# Patient Record
Sex: FEMALE | Race: WHITE | NOT HISPANIC OR LATINO | URBAN - METROPOLITAN AREA
[De-identification: names, ages, dates, MRNs, and addresses within clinical notes are randomized per-mention and may not be internally consistent; named-entity substitution may affect disease eponyms.]

---

## 2023-03-24 ENCOUNTER — EMERGENCY (EMERGENCY)
Facility: HOSPITAL | Age: 48
LOS: 0 days | Discharge: HOME | End: 2023-03-24
Attending: EMERGENCY MEDICINE
Payer: MEDICARE

## 2023-03-24 VITALS
HEART RATE: 88 BPM | RESPIRATION RATE: 18 BRPM | OXYGEN SATURATION: 100 % | DIASTOLIC BLOOD PRESSURE: 72 MMHG | SYSTOLIC BLOOD PRESSURE: 120 MMHG | TEMPERATURE: 98 F

## 2023-03-24 VITALS — HEIGHT: 67 IN | WEIGHT: 119.93 LBS

## 2023-03-24 DIAGNOSIS — E03.9 HYPOTHYROIDISM, UNSPECIFIED: ICD-10-CM

## 2023-03-24 DIAGNOSIS — R68.84 JAW PAIN: ICD-10-CM

## 2023-03-24 DIAGNOSIS — H10.9 UNSPECIFIED CONJUNCTIVITIS: ICD-10-CM

## 2023-03-24 DIAGNOSIS — Z88.5 ALLERGY STATUS TO NARCOTIC AGENT: ICD-10-CM

## 2023-03-24 DIAGNOSIS — R51.9 HEADACHE, UNSPECIFIED: ICD-10-CM

## 2023-03-24 DIAGNOSIS — J32.9 CHRONIC SINUSITIS, UNSPECIFIED: ICD-10-CM

## 2023-03-24 DIAGNOSIS — H57.89 OTHER SPECIFIED DISORDERS OF EYE AND ADNEXA: ICD-10-CM

## 2023-03-24 LAB
ALBUMIN SERPL ELPH-MCNC: 4 G/DL — SIGNIFICANT CHANGE UP (ref 3.3–5)
ALP SERPL-CCNC: 71 U/L — SIGNIFICANT CHANGE UP (ref 40–120)
ALT FLD-CCNC: 74 U/L — SIGNIFICANT CHANGE UP (ref 12–78)
ANION GAP SERPL CALC-SCNC: 5 MMOL/L — SIGNIFICANT CHANGE UP (ref 5–17)
AST SERPL-CCNC: 36 U/L — SIGNIFICANT CHANGE UP (ref 15–37)
BASOPHILS # BLD AUTO: 0.06 K/UL — SIGNIFICANT CHANGE UP (ref 0–0.2)
BASOPHILS NFR BLD AUTO: 0.9 % — SIGNIFICANT CHANGE UP (ref 0–2)
BILIRUB SERPL-MCNC: 0.5 MG/DL — SIGNIFICANT CHANGE UP (ref 0.2–1.2)
BUN SERPL-MCNC: 13 MG/DL — SIGNIFICANT CHANGE UP (ref 7–23)
CALCIUM SERPL-MCNC: 9.7 MG/DL — SIGNIFICANT CHANGE UP (ref 8.5–10.1)
CHLORIDE SERPL-SCNC: 108 MMOL/L — SIGNIFICANT CHANGE UP (ref 96–108)
CO2 SERPL-SCNC: 29 MMOL/L — SIGNIFICANT CHANGE UP (ref 22–31)
CREAT SERPL-MCNC: 0.43 MG/DL — LOW (ref 0.5–1.3)
EGFR: 121 ML/MIN/1.73M2 — SIGNIFICANT CHANGE UP
EOSINOPHIL # BLD AUTO: 0.14 K/UL — SIGNIFICANT CHANGE UP (ref 0–0.5)
EOSINOPHIL NFR BLD AUTO: 2.2 % — SIGNIFICANT CHANGE UP (ref 0–6)
GLUCOSE SERPL-MCNC: 110 MG/DL — HIGH (ref 70–99)
HCG SERPL-ACNC: <1 MIU/ML — SIGNIFICANT CHANGE UP
HCT VFR BLD CALC: 43.9 % — SIGNIFICANT CHANGE UP (ref 34.5–45)
HGB BLD-MCNC: 14.4 G/DL — SIGNIFICANT CHANGE UP (ref 11.5–15.5)
IMM GRANULOCYTES NFR BLD AUTO: 0.2 % — SIGNIFICANT CHANGE UP (ref 0–0.9)
LACTATE SERPL-SCNC: 0.9 MMOL/L — SIGNIFICANT CHANGE UP (ref 0.7–2)
LYMPHOCYTES # BLD AUTO: 2.04 K/UL — SIGNIFICANT CHANGE UP (ref 1–3.3)
LYMPHOCYTES # BLD AUTO: 31.5 % — SIGNIFICANT CHANGE UP (ref 13–44)
MCHC RBC-ENTMCNC: 30.8 PG — SIGNIFICANT CHANGE UP (ref 27–34)
MCHC RBC-ENTMCNC: 32.8 GM/DL — SIGNIFICANT CHANGE UP (ref 32–36)
MCV RBC AUTO: 94 FL — SIGNIFICANT CHANGE UP (ref 80–100)
MONOCYTES # BLD AUTO: 0.49 K/UL — SIGNIFICANT CHANGE UP (ref 0–0.9)
MONOCYTES NFR BLD AUTO: 7.6 % — SIGNIFICANT CHANGE UP (ref 2–14)
NEUTROPHILS # BLD AUTO: 3.73 K/UL — SIGNIFICANT CHANGE UP (ref 1.8–7.4)
NEUTROPHILS NFR BLD AUTO: 57.6 % — SIGNIFICANT CHANGE UP (ref 43–77)
PLATELET # BLD AUTO: 236 K/UL — SIGNIFICANT CHANGE UP (ref 150–400)
POTASSIUM SERPL-MCNC: 3.9 MMOL/L — SIGNIFICANT CHANGE UP (ref 3.5–5.3)
POTASSIUM SERPL-SCNC: 3.9 MMOL/L — SIGNIFICANT CHANGE UP (ref 3.5–5.3)
PROT SERPL-MCNC: 7.4 GM/DL — SIGNIFICANT CHANGE UP (ref 6–8.3)
RBC # BLD: 4.67 M/UL — SIGNIFICANT CHANGE UP (ref 3.8–5.2)
RBC # FLD: 13.8 % — SIGNIFICANT CHANGE UP (ref 10.3–14.5)
SODIUM SERPL-SCNC: 142 MMOL/L — SIGNIFICANT CHANGE UP (ref 135–145)
WBC # BLD: 6.47 K/UL — SIGNIFICANT CHANGE UP (ref 3.8–10.5)
WBC # FLD AUTO: 6.47 K/UL — SIGNIFICANT CHANGE UP (ref 3.8–10.5)

## 2023-03-24 PROCEDURE — 70487 CT MAXILLOFACIAL W/DYE: CPT | Mod: MA

## 2023-03-24 PROCEDURE — 87040 BLOOD CULTURE FOR BACTERIA: CPT | Mod: 91

## 2023-03-24 PROCEDURE — 99284 EMERGENCY DEPT VISIT MOD MDM: CPT

## 2023-03-24 PROCEDURE — 36415 COLL VENOUS BLD VENIPUNCTURE: CPT

## 2023-03-24 PROCEDURE — 80053 COMPREHEN METABOLIC PANEL: CPT

## 2023-03-24 PROCEDURE — 99284 EMERGENCY DEPT VISIT MOD MDM: CPT | Mod: 25

## 2023-03-24 PROCEDURE — 70487 CT MAXILLOFACIAL W/DYE: CPT | Mod: 26,MA

## 2023-03-24 PROCEDURE — 84702 CHORIONIC GONADOTROPIN TEST: CPT

## 2023-03-24 PROCEDURE — 85025 COMPLETE CBC W/AUTO DIFF WBC: CPT

## 2023-03-24 PROCEDURE — 83605 ASSAY OF LACTIC ACID: CPT

## 2023-03-24 RX ADMIN — Medication 1 TABLET(S): at 10:54

## 2023-03-24 NOTE — ED PROVIDER NOTE - PATIENT PORTAL LINK FT
You can access the FollowMyHealth Patient Portal offered by [facility] by registering at the following website: [email or web address]. By joining [employer]’s FollowMyHealth portal, you will also be able to view your health information using other applications (apps) compatible with our system.

## 2023-03-24 NOTE — ED PROVIDER NOTE - NSFOLLOWUPINSTRUCTIONS_ED_ALL_ED_FT
Please call and follow up with your doctor in 1-3 days.    Use Tylenol (acetaminophen) 1000mg every 6 hours and Motrin (Ibuprofen) 600 mg every 6 hours as need for fever/pain.    Return to the Emergency Department for worsening or persistent symptoms, and/or ANY NEW OR CONCERNING SYMPTOMS. If you have issues obtaining follow up, please call: [phone] ([phone] or [phone]  to obtain a doctor or specialist who takes your insurance in your area.      Conjunctivitis    WHAT YOU NEED TO KNOW:    What is conjunctivitis? Conjunctivitis, or pink eye, is inflammation of your conjunctiva. The conjunctiva is a thin tissue that covers the front of your eye and the back of your eyelids. The conjunctiva helps protect your eye and keep it moist.   Conjunctivitis         What causes conjunctivitis? Conjunctivitis is easily spread from person to person. The most common cause of conjunctivitis is infection with bacteria or a virus. This often happens when bacteria gets into your eye. This can happen when you touch your eye or wear contact lenses. Allergies are also a common cause of conjunctivitis. The cells in your conjunctiva can react to an allergen. Some examples of allergens include grass, dust, animal fur, or mascara.    What are the signs and symptoms of conjunctivitis? You will usually have symptoms in both eyes if your conjunctivitis is caused by allergies. You may also have other allergic symptoms, such as a rash or runny nose. Symptoms will usually start in 1 eye if your conjunctivitis is caused by a virus or bacteria. You may also have other symptoms of an infection, such as sore throat and fever. You may have any of the following:   •Redness in the whites of your eye      •Itching in your eye or around your eye      •Feeling like there is something in your eye      •Watery or thick, sticky discharge      •Crusty eyelids when you wake up in the morning      •Burning, stinging, or swelling in your eye      •Pain when you see bright light      How is conjunctivitis diagnosed? Your healthcare provider will ask about your symptoms and medical history. The provider will ask if you have been around anyone who is sick or has pink eye. The provider will ask if you have allergies. Tell your provider if you wear contact lenses. You may need any of the following:   •An eye exam will be done by your provider. Your provider will look at your eyes, eyelids, eyelashes, and the skin around your eyes. You will be asked to look in different directions. Your provider may gently press on your eye or eyelid to see if there is drainage. Your provider will also look for redness and swelling in your eyelids or conjunctiva. Your provider may gently swab your conjunctiva with a cotton swab and send it to the lab for tests. This will help your provider find out what is causing your conjunctivitis.       •A slit-lamp microscope is a special microscope with a bright light used to look into your eye. Your provider will look for signs of infection or inflammation. This microscope also helps your provider see if the different parts of your eyes are healthy.      How is conjunctivitis treated? Your conjunctivitis may go away on its own. Treatment depends on what is causing your conjunctivitis. You may need any of the following:   •Allergy medicine helps decrease itchy, red, swollen eyes caused by allergies. It may be given as a pill, eye drops, or nasal spray.      •Antibiotics may be needed if your conjunctivitis is caused by bacteria. This medicine may be given as a pill, eye drops, or eye ointment.      How can I manage my symptoms?   •Apply a cool compress. Wet a washcloth with cold water and place it on your eye. This will help decrease itching and irritation.      •Do not wear contact lenses. They can irritate your eye. Throw away the pair you are using and ask when you can wear them again. Use a new pair of lenses when your provider says it is okay.       •Avoid irritants. Stay away from smoke filled areas. Shield your eyes from wind and sun.       •Flush your eye. You may need to flush your eye with saline to help decrease your symptoms. Ask for more information on how to flush your eye.       How do I prevent the spread of conjunctivitis?   •Wash your hands with soap and water often. Wash your hands before and after you touch your eyes. Also wash your hands before you prepare or eat food and after you use the bathroom or change a diaper.  Handwashing           •Avoid allergens. Try to avoid the things that cause your allergies, such as pets, dust, or grass.       •Avoid contact with others. Do not share towels or washcloths. Try to stay away from others as much as possible. Ask when you can return to work or school.       •Throw away eye makeup. The bacteria that caused your conjunctivitis can stay in eye makeup. Throw away your current mascara and other eye makeup. Never share mascara or other eye makeup with anyone.      When should I seek immediate care?   •You have worsening eye pain.       •The swelling in your eye gets worse, even after treatment.       •Your vision suddenly becomes worse or you cannot see at all.      When should I call my doctor?   •You develop a fever and ear pain.      •You have tiny bumps or spots of blood on your eye.      •You have questions or concerns about your condition or care.      CARE AGREEMENT:    You have the right to help plan your care. Learn about your health condition and how it may be treated. Discuss treatment options with your healthcare providers to decide what care you want to receive. You always have the right to refuse treatment      Acute Pain, Adult      Acute pain is a type of sudden pain that may last for just a few days or for as long as six months. It is often related to an illness, injury, or medical procedure. Acute pain may be mild, moderate, or severe.    Pain can make it hard for you to do your normal, daily activities. It can cause anxiety and lead to other problems if it is left untreated. Treatment depends on the cause and severity of your pain. Acute pain usually goes away once your injury has healed or you are no longer ill.      Follow these instructions at home:      Medicines      •Take over-the-counter and prescription medicines only as told by your health care provider.      •Take the lowest dose of medicine for the shortest amount of time needed to relieve the pain.    •If you are taking prescription pain medicine:  •Do not stop taking the medicine suddenly. Talk to your health care provider about how and when to discontinue prescription medicine.      •Do not take more pills than told by your health care provider even if your pain is severe.      •Do not take other over-the-counter pain medicines in addition to prescription pain medicine unless told by your health care provider.      •Ask your health care provider if the medicine requires you to avoid driving or using heavy machinery.    •Ask your health care provider if the medicine can cause constipation. You may need to take these actions to prevent or treat constipation:  •Drink enough fluid to keep your urine pale yellow.      •Eat foods that are high in fiber, such as beans, whole grains, and fresh fruits and vegetables.      •Take over-the-counter or prescription medicines.      •Limit foods that are high in fat and processed sugars, such as fried or sweet foods.            Managing pain, stiffness, and swelling                   If directed, put ice on the affected area. To do this:  •Put ice in a plastic bag.      •Place a towel between your skin and the bag.      •Leave the ice on for 20 minutes, 2–3 times a day.      If directed, apply heat to the affected area as often as told by your health care provider. Use the heat source that your health care provider recommends, such as a moist heat pack or a heating pad.  •Place a towel between your skin and the heat source.      •Leave the heat on for 20–30 minutes.      •Remove the heat if your skin turns bright red. This is especially important if you are unable to feel pain, heat, or cold. You may have a greater risk of getting burned.      Activity     •Rest as told by your health care provider.      •Return to your normal activities as told by your health care provider. Ask your health care provider what activities are safe for you.      General instructions     •Check your pain level as told by your health care provider.      •Ask your health care provider if other strategies such as distraction, relaxation, or physical therapies can help your pain.      •Keep all follow-up visits as told by your health care provider. This is important.        Contact a health care provider if:    •Your pain is not controlled by medicine.      •Your pain does not improve or gets worse.      •You have side effects from pain medicines, such as vomiting or confusion.        Get help right away if you:    •Have severe pain.      •Have trouble breathing.      •Lose consciousness.    •Have chest pain or pressure that lasts for more than a few minutes, or if you have other symptoms along with chest pain, including if you:  •Have pain or discomfort in one or both arms, your back, neck, jaw, or stomach.      •Have shortness of breath.      •Break out in a cold sweat.      •Feel nauseous.      •Become light-headed.        These symptoms may represent a serious problem that is an emergency. Do not wait to see if the symptoms will go away. Get medical help right away. Call your local emergency services (911 in the [place] Do not drive yourself to the hospital.       Summary    •Acute pain may be mild, moderate, or severe. It usually goes away once your injury has healed or you are no longer ill.      •Take over-the-counter and prescription medicines only as told by your health care provider.      •Ask your health care provider if the medicine prescribed to you can cause constipation.      •Contact a health care provider if your pain is not controlled by medicine.      This information is not intended to replace advice given to you by your health care provider. Make sure you discuss any questions you have with your health care provider.

## 2023-03-24 NOTE — ED ADULT NURSE NOTE - OBJECTIVE STATEMENT
Pt presents to the ED AOx4 from home c/o b/l jaw/mouth pain 10/10 radiating to left shoulder/back beginning a year ago. Pt reports pain has become unbearable and new onset blood shot eyes. Pt reports a numerous dental work that has only increased her pain and never found a direct source. Pt denies N/V/D, fever, chills, blurred vision, diaphoresis, weakness or change in balance. Safety and comfort measures in place at this time.

## 2023-03-24 NOTE — ED PROVIDER NOTE - CARE PLAN
1 Principal Discharge DX:	Conjunctivitis  Secondary Diagnosis:	Jaw pain  Secondary Diagnosis:	Sinusitis

## 2023-03-24 NOTE — ED PROVIDER NOTE - EYES, MLM
Clear bilaterally, pupils equal, round and reactive to light.  + injections of sclarea bilaterally and mild redness of subconjunctiva

## 2023-03-24 NOTE — ED PROVIDER NOTE - PROGRESS NOTE DETAILS
Attending Chase, pt updated on results of tests.  Will start Augmentin and plan d/c and follow up outpt doctors.

## 2023-03-24 NOTE — ED ADULT NURSE NOTE - CHIEF COMPLAINT QUOTE
patient c/o jaw pain.  patient reports top and bottom jaw pain x years with associated facial pain.  reports eyes have been tearing for the last month.  was seen recently at [facility] and has been using eye drops with no relief.

## 2023-03-24 NOTE — ED PROVIDER NOTE - OBJECTIVE STATEMENT
47-year-old patient presents emergency department with facial pain and redness of the eyes.  Patient states she has been dealing with jaw pain for the last several years.  Has seen multiple oral surgeons and has had multiple procedures, however no one can give her reason for pain.  More recently patient has been developing increasing pain in the top and bottom jaw and now with redness to the eyes.  Patient went to see [facility] and seen in the ED and started on ciprofloxacin eyedrops. patient states symptoms are not improving and presents to the ER for follow-up.

## 2023-03-24 NOTE — ED PROVIDER NOTE - ENMT, MLM
Airway patent, Nasal mucosa clear. Mouth with normal mucosa. Throat has no vesicles, no oropharyngeal exudates and uvula is midline.  No redness of gums, no drainage, nontender palpation under tongue, Molars with fillings.

## 2023-03-24 NOTE — ED PROVIDER NOTE - CLINICAL SUMMARY MEDICAL DECISION MAKING FREE TEXT BOX
47-year-old patient presents emerged department for jaw pain for the last several years and has seen multiple oral surgeons, along with eye redness.  Patient was seen at  another ED and was started on ciprofloxacin eyedrops.  Plan check labs, CT scan

## 2023-03-24 NOTE — ED ADULT TRIAGE NOTE - CHIEF COMPLAINT QUOTE
patient c/o jaw pain.  patient reports top and bottom jaw pain x years with associated facial pain.  reports eyes have been tearing for the last month.  was seen recently at [facility] and has been using eye drops with no relief.

## 2023-03-29 LAB
CULTURE RESULTS: SIGNIFICANT CHANGE UP
CULTURE RESULTS: SIGNIFICANT CHANGE UP
SPECIMEN SOURCE: SIGNIFICANT CHANGE UP
SPECIMEN SOURCE: SIGNIFICANT CHANGE UP

## 2023-07-13 PROBLEM — E03.9 HYPOTHYROIDISM, UNSPECIFIED: Chronic | Status: ACTIVE | Noted: 2023-03-24

## 2023-07-25 ENCOUNTER — APPOINTMENT (OUTPATIENT)
Dept: MRI IMAGING | Facility: CLINIC | Age: 48
End: 2023-07-25
Payer: MEDICARE

## 2023-07-25 PROCEDURE — 70542 MRI ORBIT/FACE/NECK W/DYE: CPT | Mod: MH

## 2023-07-25 PROCEDURE — A9585: CPT | Mod: JW

## 2023-11-07 PROBLEM — Z00.00 ENCOUNTER FOR PREVENTIVE HEALTH EXAMINATION: Status: ACTIVE | Noted: 2023-11-07

## 2023-11-10 ENCOUNTER — APPOINTMENT (OUTPATIENT)
Dept: MRI IMAGING | Facility: CLINIC | Age: 48
End: 2023-11-10
Payer: MEDICARE

## 2023-11-10 PROCEDURE — A9585: CPT | Mod: JW

## 2023-11-10 PROCEDURE — 70542 MRI ORBIT/FACE/NECK W/DYE: CPT | Mod: MH

## 2024-06-11 ENCOUNTER — APPOINTMENT (OUTPATIENT)
Age: 49
End: 2024-06-11
Payer: MEDICARE

## 2024-06-11 PROCEDURE — 99204 OFFICE O/P NEW MOD 45 MIN: CPT
